# Patient Record
Sex: MALE | Race: WHITE | NOT HISPANIC OR LATINO | Employment: FULL TIME | URBAN - METROPOLITAN AREA
[De-identification: names, ages, dates, MRNs, and addresses within clinical notes are randomized per-mention and may not be internally consistent; named-entity substitution may affect disease eponyms.]

---

## 2019-03-15 ENCOUNTER — OFFICE VISIT - HEALTHEAST (OUTPATIENT)
Dept: FAMILY MEDICINE | Facility: CLINIC | Age: 39
End: 2019-03-15

## 2019-03-15 DIAGNOSIS — M54.50 ACUTE BILATERAL LOW BACK PAIN WITHOUT SCIATICA: ICD-10-CM

## 2019-03-15 RX ORDER — CYCLOBENZAPRINE HCL 10 MG
10 TABLET ORAL
Qty: 30 TABLET | Refills: 0 | Status: SHIPPED | OUTPATIENT
Start: 2019-03-15

## 2019-03-15 RX ORDER — IBUPROFEN 800 MG/1
800 TABLET, FILM COATED ORAL EVERY 8 HOURS PRN
Qty: 30 TABLET | Refills: 0 | Status: SHIPPED | OUTPATIENT
Start: 2019-03-15

## 2020-11-18 ENCOUNTER — OFFICE VISIT - HEALTHEAST (OUTPATIENT)
Dept: FAMILY MEDICINE | Facility: CLINIC | Age: 40
End: 2020-11-18

## 2020-11-18 DIAGNOSIS — H04.202 WATERING OF LEFT EYE: ICD-10-CM

## 2020-11-18 DIAGNOSIS — H57.12 LEFT EYE PAIN: ICD-10-CM

## 2021-06-02 VITALS — WEIGHT: 258.5 LBS

## 2021-06-05 VITALS
DIASTOLIC BLOOD PRESSURE: 95 MMHG | HEART RATE: 103 BPM | RESPIRATION RATE: 16 BRPM | TEMPERATURE: 98.2 F | SYSTOLIC BLOOD PRESSURE: 129 MMHG | WEIGHT: 237.56 LBS | OXYGEN SATURATION: 97 %

## 2021-06-13 NOTE — PATIENT INSTRUCTIONS - HE
1. You will receive a call to get set up with the eye doctor.  2. We will try allergy eyedrops in the meantime. You may also take ibuprofen for discomfort as needed.  3. If you experience sudden loss of vision or worsening eye pain, go to the ER.

## 2021-06-13 NOTE — PROGRESS NOTES
Chief Complaint   Patient presents with     Eye Pain     [id number], L eye, feels like something is in there       HPI:  Rai Encinas is a 40 y.o. male who presents for evaluation of moderate left eye pain onset 1 month ago. He does not recall an injury or foreign body exposure but it feels like he has something in his eye near his left upper eyelid. He reports the eye often is watery for several hours in the morning and also notes rhinorrhea & sneezing frequently. Symptoms are constant in duration. He has tried lubricating eyedrops without improvement. Patient reports no fever/chills, headache, pain with eye movement, eye redness, eye itching, vision changes, thick eye drainage, nausea, vomiting, rash, or any other symptoms. He does not wear contact lenses.    Past Medical History:   Diagnosis Date     Leg fracture     Left fibula     Past Surgical History:   Procedure Laterality Date     NO PAST SURGERIES       Social History     Tobacco Use     Smoking status: Former Smoker     Types: Cigarettes     Smokeless tobacco: Never Used   Substance Use Topics     Alcohol use: Not on file     Family History   Problem Relation Age of Onset     No Medical Problems Mother      Other Father         History unknown        Review of Systems   Constitutional: Negative for chills and fever.   HENT: Positive for rhinorrhea and sneezing.    Eyes: Positive for pain. Negative for discharge, redness, itching and visual disturbance.   Respiratory: Negative for shortness of breath.    Cardiovascular: Negative for chest pain.   Gastrointestinal: Negative for abdominal pain, diarrhea, nausea and vomiting.   Skin: Negative for rash.   All other systems reviewed and are negative.      Vitals:    11/18/20 1046   BP: (!) 129/95   Patient Site: Right Arm   Patient Position: Sitting   Cuff Size: Adult Large   Pulse: (!) 103   Resp: 16   Temp: 98.2  F (36.8  C)   TempSrc: Oral   SpO2: 97%   Weight: (!) 237 lb 9 oz (107.8 kg)       Physical Exam    Constitutional: He appears well-developed and well-nourished.  Non-toxic appearance.   HENT:   Head: Normocephalic and atraumatic.   Nose: Rhinorrhea present.   Eyes: Pupils are equal, round, and reactive to light. Conjunctivae, EOM and lids are normal. Left eye exhibits no hordeolum.   Slit lamp exam:       The left eye shows no fluorescein uptake.   Left upper eyelid everted- no foreign body or abnormality noted   Cardiovascular: Normal rate, regular rhythm and normal heart sounds.   Pulmonary/Chest: Effort normal and breath sounds normal.   Neurological: He is alert.   Skin: Skin is warm and dry.   Psychiatric: He has a normal mood and affect.       Labs:  No results found for this or any previous visit (from the past 24 hour(s)).    Radiology:  No results found.    Clinical Decision Making:    Left eye normal on exam- no conjunctival injection, eyelid swelling or hordeolum noted. No evidence of corneal abrasion with fluorescein exam. Given associated rhinorrhea/sneezing and watering of the eye, will try Patanol eyedrops. Also recommend f/u with ophthalmology; referral placed.  See patient instructions below.    At the end of the encounter, I discussed results, diagnosis, medications. Discussed red flags for immediate return to clinic/ER, as well as indications for follow up if no improvement. Patient understood and agreed to plan. Patient was stable for discharge.    1. Left eye pain  Ambulatory referral to Ophthalmology   2. Watering of left eye  olopatadine (PATANOL) 0.1 % ophthalmic solution         Return in about 3 days (around 11/21/2020) for follow up with eye doctor.    ANDREW Del Rosario, AKBAR  [facility]    Patient Instructions   1. You will receive a call to get set up with the eye doctor.  2. We will try allergy eyedrops in the meantime. You may also take ibuprofen for discomfort as needed.  3. If you experience sudden loss of vision or worsening eye pain, go to the ER.

## 2021-06-17 NOTE — PATIENT INSTRUCTIONS - HE
Patient Instructions by Pablo Bunch MD at 3/15/2019  7:40 AM     Author: Pablo Bunch MD Service: -- Author Type: Physician    Filed: 3/15/2019  9:48 AM Encounter Date: 3/15/2019 Status: Addendum    : Pablo Bunch MD (Physician)    Related Notes: Original Note by Pablo Bunch MD (Physician) filed at 3/15/2019  9:48 AM       - X-rays of your low back show no acute bony injuries.   - Take ibuprofen as needed for pain / inflammation.   - Take cyclobenzaprine only as needed for low back spasm. This medication can be sedating. Do not drive within 8-10 hours of taking it. Do not drink alcoholic beverages while using this medication. Do not take other sedating medications while using this medication.   - Apply heat to your low back as needed for pain / spasm.       Patient Education     Back Pain (Acute or Chronic)    Back pain is one of the most common problems. The good news is that most people feel better in 1 to 2 weeks, and most of the rest in 1 to 2 months. Most people can remain active.  People who have pain describe it differently--not everyone is the same.    The pain can be sharp, stabbing, shooting, aching, cramping or burning.    Movement, standing, bending, lifting, sitting, or walking may worsen pain.    It can be localized to one spot or area, or it can be more generalized.    It can spread or radiate upwards, to the front, or go down your arms or legs (sciatica).    It can cause muscle spasm.  Most of the time, mechanical problems with the muscles or spine cause the pain. Mechanical problems are usually caused by an injury to the muscles or ligaments. While illness can cause back pain, it is usually not caused by a serious illness. Mechanical problems include:     Physical activity such as sports, exercise, work, or normal activity    Overexertion, lifting, pushing, pulling incorrectly or too aggressively    Sudden twisting, bending, or stretching from an accident, or  accidental movement    Poor posture    Stretching or moving wrong, without noticing pain at the time    Poor coordination, lack of regular exercise (check with your doctor about this)    Spinal disc disease or arthritis    Stress  Pain can also be related to pregnancy, or illness like appendicitis, bladder or kidney infections, pelvic infections, and many other things.  Acute back pain usually gets better in 1 to 2 weeks. Back pain related to disk disease, arthritis in the spinal joints or spinal stenosis (narrowing of the spinal canal) can become chronic and last for months or years.  Unless you had a physical injury (for example, a car accident or fall) X-rays are usually not needed for the initial evaluation of back pain. If pain continues and does not respond to medical treatment, X-rays and other tests may be needed.  Home care  Try these home care recommendations:    When in bed, try to find a position of comfort. A firm mattress is best. Try lying flat on your back with pillows under your knees. You can also try lying on your side with your knees bent up towards your chest and a pillow between your knees.    At first, do not try to stretch out the sore spots. If there is a strain, it is not like the good soreness you get after exercising without an injury. In this case, stretching may make it worse.    Don't sit for long periods, as in a long car ride or during other travel. This puts more stress on the lower back than standing or walking.    During the first 24 to 72 hours after an acute injury or flare up of chronic back pain, apply an ice pack to the painful area for 20 minutes and then remove it for 20 minutes. Do this over a period of 60 to 90 minutes or several times a day. This will reduce swelling and pain. Wrap the ice pack in a thin towel or plastic to protect your skin.    You can start with ice, then switch to heat. Heat (hot shower, hot bath, or heating pad) reduces pain and works well for muscle  spasms. Heat can be applied to the painful area for 20 minutes then remove it for 20 minutes. Do this over a period of 60 to 90 minutes or several times a day. Do not sleep on a heating pad. It can lead to skin burns or tissue damage.    You can alternate ice and heat therapy. Talk with your doctor about the best treatment for your back pain.    Therapeutic massage can help relax the back muscles without stretching them.    Be aware of safe lifting methods and do not lift anything without stretching first.  Medicines  Talk to your doctor before using medicine, especially if you have other medical problems or are taking other medicines.    You may use over-the-counter medicine as directed on the bottle to control pain, unless another pain medicine was prescribed. If you have chronic conditions like diabetes, liver or kidney disease, stomach ulcers, or gastrointestinal bleeding, or are taking blood thinners, talk to your doctor before taking any medicine.    Be careful if you are given a prescription medicines, narcotics, or medicine for muscle spasms. They can cause drowsiness, affect your coordination, reflexes, and judgement. Do not drive or operate heavy machinery.  Follow-up care  Follow up with your healthcare provider, or as advised.   A radiologist will review any X-rays that were taken. Your provide will notify you of any new findings that may affect your care.  Call 911  Call 911 if any of the following occur:    Trouble breathing    Confusion    Very drowsy or trouble awakening    Fainting or loss of consciousness    Rapid or very slow heart rate    Loss of bowel or bladder control  When to seek medical advice  Call your healthcare provider right away if any of these occur:     Pain becomes worse or spreads to your legs    Weakness or numbness in one or both legs    Numbness in the groin or genital area  Date Last Reviewed: 7/1/2016 2000-2017 [employer]. [address], [place]  [address]. All rights reserved. This information is not intended as a substitute for professional medical care. Always follow your healthcare professional's instructions.

## 2021-06-19 NOTE — LETTER
Letter by Pablo Bunch MD at      Author: Pablo Bunch MD Service: -- Author Type: --    Filed:  Encounter Date: 3/15/2019 Status: (Other)       March 15, 2019     Patient: Rai Encinas   YOB: 1980   Date of Visit: 3/15/2019       To Whom it May Concern:    Rai Encinas was seen in my clinic on 3/15/2019. He should follow a 25 lbs lifting restriction for through 3/19/2019 due to injury. He may then resume work without restrictions on 3/20/2019.     If you have any questions or concerns, please don't hesitate to call.    Sincerely,         Electronically signed by Pablo Bunch MD

## 2021-06-25 NOTE — PROGRESS NOTES
Subjective:   Rai Encinas is a(n) 38 y.o. White or  male who presents to [facility] with the following complaint(s):  Back Pain (patient chipping ice yesterday and pulled something in lower back area, woke up this morning patient is feeling alot more pain and numbness in both legs)    History of Present Illness:  Primary symptom: Back pain  Onset: Yesterday  Progression: Worsening  Location: Low back  Laterality: Central  Quality: Sharp  Pain rating (0-10): 8  Frequency: Constant  Relieving factors: Laying down and sitting up straight  Exacerbating factors: Bending forward  Radicular pain: No  Lower extremity weakness: Legs felt weak upon waking this morning; was able to slowly roll in bed and eventually sit up and get out of bed and walk. Legs have not buckled.   Lower extremity paresthesias: No  Saddle anesthesia: No  Bowel incontinence: No  Bladder incontinence: No  Additional symptoms: None  Known injury: Was swinging a pick axe yesterday to chop ice near his home. Bovey a sudden shooting pain in the lower back when the axe hit the ice. Stopped this activity immediately.   History of similar pain: No. Had issues with mid back pain approximately 1-2 months ago.   History of spine surgery: No  Home treatments utilized: Ibuprofen 600 mg once last night and again this morning. Has not applied ice. Applied hot water to the area.   Tobacco user / exposure: Light smoker of cigarettes and e-cigarettes.     The following portions of the patient's history were reviewed and updated as appropriate: allergies, current medications, past family history, past medical history, past social history, past surgical history and problem list.    Review of Systems:   Review of Systems   All other systems reviewed and are negative.    Objective:     Vitals:    03/15/19 0754   BP: 140/86   Pulse: 82   Resp: 12   SpO2: 99%   Weight: (!) 258 lb 8 oz (117.3 kg)     Physical Exam   Constitutional: He is oriented to person,  place, and time. He appears well-developed.  Non-toxic appearance. No distress.   Overweight.    Cardiovascular: Normal rate, regular rhythm, S1 normal and S2 normal. Exam reveals no gallop and no friction rub.   No murmur heard.  Pulmonary/Chest: Effort normal and breath sounds normal. No stridor. He has no wheezes. He has no rhonchi. He has no rales.   Musculoskeletal:        Cervical back: He exhibits normal range of motion, no tenderness, no bony tenderness, no deformity and no spasm.        Thoracic back: He exhibits no tenderness, no bony tenderness, no deformity and no spasm.        Lumbar back: He exhibits no tenderness, no bony tenderness, no deformity and no spasm.   Strength with ankle plantarflexion / dorsiflexion, knee flexion / extension, hip flexion / extension, and hip abduction / adduction 5/5 and symmetric.    Neurological: He is alert and oriented to person, place, and time. He has normal strength. No cranial nerve deficit or sensory deficit.   Reflex Scores:       Patellar reflexes are 1+ on the right side and 1+ on the left side.       Achilles reflexes are 1+ on the right side and 1+ on the left side.  No clonus at the ankle bilaterally.   Straight leg raise negative bilaterally.    Skin: Skin is warm and dry. No rash noted. No pallor.   Nursing note and vitals reviewed.    Laboratory:  N/A    Radiology:  EXAM DATE:         03/15/2019     EXAM: X-RAY LUMBAR SPINE, AP AND LATERAL  LOCATION: [facility]  DATE/TIME: 3/15/2019 8:30 AM     INDICATION: Acute low back pain  COMPARISON: None.     FINDINGS: 5 lumbar type vertebral bodies. Minimal right lumbar curvature on the AP view. Lateral lumbar alignment shows straightening of the normal lumbar lordosis with minimal retrolisthesis of L2 on L3. This is likely chronic. Normal vertebral body heights. Slight narrowing of the L4-5 and L5-S1 intervertebral discs. Normal facets.    Electrocardiogram:  N/A    Assessment/Plan   1. Acute  bilateral low back pain without sciatica  - XR Lumbar Spine 2 or 3 VWS; Future  - XR Lumbar Spine 2 or 3 VWS  - ibuprofen (ADVIL,MOTRIN) 800 MG tablet; Take 1 tablet (800 mg total) by mouth every 8 (eight) hours as needed for pain.  Dispense: 30 tablet; Refill: 0  - cyclobenzaprine (FLEXERIL) 10 MG tablet; Take 1 tablet (10 mg total) by mouth at bedtime as needed for muscle spasms.  Dispense: 30 tablet; Refill: 0    - X-rays completed due to abrupt onset of pain during activity. X-ray results reviewed with patient. Treating low back pain with ibuprofen, cyclobenzaprine, and heat. Work excuse provided for lifting restriction of 25 lbs until 3/20/2019.   - Counseled patient regarding assessment and plan for evaluation and treatment. Questions were answered. See AVS for the specific written instructions and educational handout(s) regarding back pain that were provided at the conclusion of the visit.   - Discussed signs / symptoms that warrant urgent / emergent medical attention.   - Follow up as needed.     Pablo Bunch MD

## 2023-09-14 ENCOUNTER — HOSPITAL ENCOUNTER (OUTPATIENT)
Dept: GENERAL RADIOLOGY | Facility: HOSPITAL | Age: 43
Discharge: HOME | End: 2023-09-14
Attending: NURSE PRACTITIONER | Admitting: NURSE PRACTITIONER
Payer: COMMERCIAL

## 2023-09-14 ENCOUNTER — OFFICE VISIT (OUTPATIENT)
Dept: FAMILY MEDICINE | Facility: CLINIC | Age: 43
End: 2023-09-14
Payer: COMMERCIAL

## 2023-09-14 VITALS
HEART RATE: 88 BPM | OXYGEN SATURATION: 97 % | DIASTOLIC BLOOD PRESSURE: 88 MMHG | SYSTOLIC BLOOD PRESSURE: 127 MMHG | RESPIRATION RATE: 20 BRPM | TEMPERATURE: 97.7 F | WEIGHT: 245 LBS

## 2023-09-14 DIAGNOSIS — M79.672 LEFT FOOT PAIN: ICD-10-CM

## 2023-09-14 DIAGNOSIS — M10.9 ACUTE GOUT OF LEFT FOOT, UNSPECIFIED CAUSE: Primary | ICD-10-CM

## 2023-09-14 PROCEDURE — 99204 OFFICE O/P NEW MOD 45 MIN: CPT | Performed by: NURSE PRACTITIONER

## 2023-09-14 PROCEDURE — 73630 X-RAY EXAM OF FOOT: CPT | Mod: LT

## 2023-09-14 RX ORDER — PREDNISONE 20 MG/1
40 TABLET ORAL DAILY
Qty: 10 TABLET | Refills: 0 | Status: SHIPPED | OUTPATIENT
Start: 2023-09-14 | End: 2023-09-19

## 2023-09-14 NOTE — PROGRESS NOTES
"Assessment & Plan     Left foot pain    - XR Foot Left G/E 3 Views  - Crutches Order for DME - ONLY FOR DME    Acute gout of left foot, unspecified cause    - predniSONE (DELTASONE) 20 MG tablet  Dispense: 10 tablet; Refill: 0     Patient awoke with severe proximal metatarsal area foot pain without obvious traumatic event, but was kneeling yesterday for work.  Tenderness around the area of the proximal fourth metatarsal.    X-ray related to this is negative.  No other joint area tenderness.  No redness.  No swelling.    Patient rates the pain as severe with inability to bear weight.    No history of gout, but will have patient avoid NSAIDs, treat for possible gout with prednisone, screening uric acid level.  Follow-up with PCP in 7 days or here in 3 days if not much better.  Patient offered and declined crutches here.  Kiran uric acid level.  Patient does not want any blood testing today.  Explained the purpose for tracking purposes and to help with diagnosis as this is not 100% clear, but patient would like to hold off.    Differential includes tendinitis, contusion.          Return in about 3 days (around 9/17/2023).    Ellie Rojas, [facility]    Oralia Atwood is a 42 year old male who presents to clinic today for the following health issues:  Chief Complaint   Patient presents with    Foot Pain     Woke up with LT foot pain, during the week experienced flank pain but today has worsened. Slight redness, no swelling.      HPI    Having pain at top of left foot x today.   No flank pain as mentioned in MA note.      Was on knees at work in \"praying position\" when fixing something yesterday.  Wondering if related.  Works in maintenance on machines. Couldn't walk on foot today.  Awoke from sleep with symptoms at 3:45 AM.  Better if not bearing weight.  No history of gout.  No specific traumatic event that he can remember.    Took \"1000 mg\" of ibuprofen.     Rates pain 8 out of " 10.          Review of Systems    See HPI        Objective    /88 (BP Location: Right arm, Patient Position: Sitting, Cuff Size: Adult Regular)   Pulse 88   Temp 97.7  F (36.5  C) (Oral)   Resp 20   Wt 111.1 kg (245 lb)   SpO2 97%   Physical Exam  Constitutional:       Appearance: Normal appearance.   Cardiovascular:      Pulses:           Dorsalis pedis pulses are 2+ on the left side.   Pulmonary:      Effort: Pulmonary effort is normal.   Musculoskeletal:         General: Tenderness present. No swelling or deformity. Normal range of motion.      Left foot: Normal range of motion.        Feet:       Comments: Normal ankle movement.  No tenderness over the ankles nor joints of the toes.    Tenderness in area indicated at the proximal portion of the fourth metatarsal area.   Neurological:      Mental Status: He is alert.   Psychiatric:         Mood and Affect: Mood normal.        Results for orders placed or performed during the hospital encounter of 09/14/23   XR Foot Left G/E 3 Views     Status: None    Narrative    EXAM: XR FOOT LEFT G/E 3 VIEWS  LOCATION: [facility]  DATE: 9/14/2023    INDICATION: pain proximal foot 4th metatarsal area  COMPARISON: None.      Impression    IMPRESSION: Normal joint spaces and alignment. No acute fracture. Healed distal fibular fracture.     I independently visualized the xray:   Neg for acute fx

## 2023-09-14 NOTE — PATIENT INSTRUCTIONS
I think you are having a first-time gout attack.    Your x-ray is normal without broken bones.    Recommend use of crutches for the first couple of days if you are in a lot of pain.  If you do not want to use hours, can asked to borrow somebody else's.  Otherwise keep your foot elevated, apply ice 20 minutes at a time at least 4-6 times per day.    Start prednisone for gout pain/inflammation. Side effects include feeling jittery or more awake with stomach upset, so take this with food and as soon as possible after you wake up (first dose given here).      Do not take ibuprofen or Aleve with the prednisone.  You may take Tylenol if needed, but it does not usually work very well for gout.    Establish care with a primary care provider and be rechecked in about a week if doing better or in 3 days here if not much better with treatment.

## 2023-09-14 NOTE — LETTER
September 14, 2023      Rai Encinas  [address] [place] [address]        To Whom It May Concern:    Rai Encinas  was seen on 9/14.  Please excuse him  until 9/17 due to illness.        Sincerely,        Ellie Rojas, CNP